# Patient Record
(demographics unavailable — no encounter records)

---

## 2024-10-09 NOTE — PHYSICAL EXAM
[Restricted in physically strenuous activity but ambulatory and able to carry out work of a light or sedentary nature] : Status 1- Restricted in physically strenuous activity but ambulatory and able to carry out work of a light or sedentary nature, e.g., light house work, office work [Normal] : grossly intact [de-identified] : Elevated BMI [de-identified] : dermatitis related to therapy

## 2024-10-09 NOTE — HISTORY OF PRESENT ILLNESS
[Disease:__________________________] : Disease: [unfilled] [de-identified] : Initial Presentation: 83 Divehi-speaking F with MedHx of pancreatic insufficiency, HTN  who initially presented to [facility] on 5/30/24 with complaints of 1 month of cough, SOB, fevers, body-aches, bruising and sore throat. She was found to have neutropenic fevers with ANC 0.00 in the setting of a WBC count of 24k and 85% blasts. 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 Her diagnosis was made on peripheral blood and she did not undergo BM biopsy on admission.  She was started on ATRA 5/31/24 and PHILIPPE on 6/1/24. Due to hyperleukocytosis as high as 28k, she was given GO x 1 on 6/1/24 in addition to a short course of hydroxyurea (5/31/24 - 6/1/24). 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 Regarding her body aches, she had some shoulder pain where upon X-ray showed the presence of acupuncture needle tips in her muscle and possibly areas of her bones in the shoulder. She underwent BM biopsy on 7/2/24 (Day 32 of combined PHILIPPE and ATRA), which showed a morphologic remission with residual PML-TJ on FISH at 1.5%. She was discharged to home   ==================================================================== Pathology:  Flow Cytometry on Peripheral blood: Consistent with Acute Promyelocytic Leukemia (APL), microgranular variant (74.5% of total) Myeloblasts (74.5% of total) with increased SS/FSC, positive for CD2 (subset), CD13, CD33, CD34 (subset positive, 17.6 % of total), CD38 (dim to negative), CD45 (dim), CD64 (small subset, dim),  (dim), MPO. NEGATIVE for sCD3, cyCD3, CD5, CD7, CD8, CD10, CD11b, CD14, CD15, CD16, CD19, CD20, CD56, cCD79a, ,  FISH studies show PML::TJ fusion (95%)  FLT3-ITD mutation: POSITIVE FLT3-TKD mutation: POSITIVE  Abnormal karyotype: 46,XX,t(15;17)(q24;q21)     {15           }/46,XX     {5           }  ==================================================================== Contacts:  Son in law Garth: [phone]  ==================================================================== Care Providers:  PMD: Dr Michelle Morin (Tel: [phone]; Fax: [phone]) Cardiology: Dr Roberth Herrera (Tel: [phone])   ==================================================================== [de-identified] : See above [de-identified] : 7/11/24: Initial outpatient visit. [place]  Kandice [id number]. She was discharged from [facility] leukemia unit on 7/9/24 and resumed PHILIPPE therapy the following day on 7/10/24 at [facility].   7/24/24: Follow-up. [place]  ID [id number]. She is currently wrapping up her 2 weeks off of therapy after induction. Her post induction peripheral blood PML-TJ testing was negative. She complains today of itching on her abdomen, legs and joint pains with continued swelling of her hands and feet. She forgot to stop Tretinoin temporarily. She was advised to hold it today until 7/29/24 (as this can cause skin irritation and itching). She continues to use furosemide prescribed by her PMD (Dr Morin) and we explained all other medications outside of Tretinoin and Arsenic trioxide will be managed by her other doctors.  8/20/24: Follow-up. [employer] Sep[employer]h [id number] for Taj[place]. Patient continues to have skin issues. We discussed the effect of arsenic and that creams can continue to be used. She will soon have a drug holiday for 4 weeks (PHILIPPE). She also complains of chronic MSK issues including low back pain, leg muscle pain. No bleeding issues, no leg edema.  9/16/24: Follow-up. Today is cycle 1 day 49. She forgot to take ATRA days 29-42 and instead started on day 47 (Sept 13). She continues to have right sided MSK complaints, unrelated to therapy / APL. We discussed follow-up with her PMD is warranted. She continues to have skin rash and itchiness, albeit somewhat improved, however she continues to use hydrocortisone cream as needed.  10/9/24: Follow-up. Today is Cycle 2 day 17. Family used for interpretation. She reports continued itching. She also has chills at night. No other constitutional issues. No headaches. She denies recent infections, fevers, GI or  issues.   A comprehensive review of systems was performed including constitutional, eyes, ENT, cardiovascular, respiratory, gastrointestinal, genitourinary, musculoskeletal, integumentary, neurological, psychiatric and hematologic / lymphatic. All pertinent positives are included in the H&P under interval history above and the remaining review of systems listed are negative.   ================================================================== Treatment Hx:  Induction: - Cycle 1 Day 1 ATRA 5/31/24 and PHILIPPE on 6/1/24. (hyperleukocytosis rec'd GO x 1 on 6/1/24 in addition to a short course of hydroxyurea (5/31/24 - 6/1/24)). She achieved MRD negativity on peripheral blood of PML-TJ (Day 44).  Consolidation with ATRA (2 weeks on and 2 weeks off x 8) and Arsenic Trioxide (4 weeks on and 4 weeks off x 4 cycles): - Cycle 1 Day 1 on 7/29/24 - Cycle 2 Day 1 on 9/23/24  ================================================================== [FreeTextEntry1] : [facility] APL 0406 Tretinoin + Arsenic + GO induction followed by Tretinoin and Arsenic consolidation

## 2024-10-09 NOTE — ASSESSMENT
[Curative] : Goals of care discussed with patient: Curative [FreeTextEntry1] : 82yo F (Turkish-speaking) on front-line induction with ATRA/PHILIPPE for High-Risk APL (Dx 6/30/2024). Bone marrow biopsy deferred at diagnosis. BM biopsy on day 32 of induction showed a morphologic response without myeloid immaturity, however there were 1.5% PML-TJ containing cells (likely differentiated) on aspirate by FISH. She was MRD negative via peripheral blood testing of PML-TJ on 7/13/24.  Consolidation with ATRA/PHILIPPE was started on 7/29/24. Today is Cycle 2 day 17 of Consolidation.  Heme:  - Continue 4 weeks on and 4 weeks off PHILIPPE at 0.15mg/kg/day M-F, HOLD for QTc > 465 ms, next cycle PHILIPPE on 9/23/24 - Continue ATRA 45 mg/m2 BID 2 weeks on and 2 weeks off, restarted 2 weeks on on 9/13/24, arsenic and PHILIPPE are off cycle - Arsenic/ATRA dermatitis: Itching of skin, hands and arms: Hydrocortisone cream as needed for symptom control - Cards: Monitor weekly QTc on EKGs with arsenic trioxide and maintain K > 4, Mg > 2. HTN: Not presently on BP meds. - HCM: HTN: Olmesartan per PMD (Dr Morin). Pancreatic insufficiency: Continue meds guided per PMD. Intermittent Volume overload (unlikely to be related to treated at this point, LVEF 57%, diastolic function determination was indeterminate on 6/4/24 TTE. Sees cardiologist Dr Herrera. - Follow-up every week on PHILIPPE therapy and every 2 weeks during her off weeks  ___ I personally have spent a total of 30 minutes of time on the date of this encounter reviewing test results, documenting findings, providing education, coordinating care and directly consulting with the patient and/or designated family member.

## 2024-11-14 NOTE — HISTORY OF PRESENT ILLNESS
[Disease:__________________________] : Disease: [unfilled] [ECOG Performance Status: 1 - Restricted in physically strenuous activity but ambulatory and able to carry out work of a light or sedentary nature] : Performance Status: 1 - Restricted in physically strenuous activity but ambulatory and able to carry out work of a light or sedentary nature, e.g., light house work, office work [de-identified] : Initial Presentation: 83 Nepali-speaking F with MedHx of pancreatic insufficiency, HTN  who initially presented to [facility] on 5/30/24 with complaints of 1 month of cough, SOB, fevers, body-aches, bruising and sore throat. She was found to have neutropenic fevers with ANC 0.00 in the setting of a WBC count of 24k and 85% blasts. 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 Her diagnosis was made on peripheral blood and she did not undergo BM biopsy on admission.  She was started on ATRA 5/31/24 and PHILIPPE on 6/1/24. Due to hyperleukocytosis as high as 28k, she was given GO x 1 on 6/1/24 in addition to a short course of hydroxyurea (5/31/24 - 6/1/24). 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 Regarding her body aches, she had some shoulder pain where upon X-ray showed the presence of acupuncture needle tips in her muscle and possibly areas of her bones in the shoulder. She underwent BM biopsy on 7/2/24 (Day 32 of combined PHILIPPE and ATRA), which showed a morphologic remission with residual PML-TJ on FISH at 1.5%. She was discharged to home   ==================================================================== Pathology:  Flow Cytometry on Peripheral blood: Consistent with Acute Promyelocytic Leukemia (APL), microgranular variant (74.5% of total) Myeloblasts (74.5% of total) with increased SS/FSC, positive for CD2 (subset), CD13, CD33, CD34 (subset positive, 17.6 % of total), CD38 (dim to negative), CD45 (dim), CD64 (small subset, dim),  (dim), MPO. NEGATIVE for sCD3, cyCD3, CD5, CD7, CD8, CD10, CD11b, CD14, CD15, CD16, CD19, CD20, CD56, cCD79a, ,  FISH studies show PML::TJ fusion (95%)  FLT3-ITD mutation: POSITIVE FLT3-TKD mutation: POSITIVE  Abnormal karyotype: 46,XX,t(15;17)(q24;q21) {15   }/46,XX  {5   }  [facility]: DNMT3A, p.Kkg745Czm, I, 2, VAF 47% c.2207G>A 19 FLT3, p.Kib169Imj, I, 13, VAF 24% c.2504A>T 20 FLT3, p.Bpf472Isa, I, 13, VAF 3% c.2503G>T 20 TET2, p.Wob6912Ynh, I, 4, VAF 47% c.3686T>C 6 FLT3, p.Vap149_Bhj202dek, I, 13, VAF <1% c.1789_1809 dup 14 *Alt: CCCATTTGAGATCATATTCATA  FLT3, p.?, I, 13, VAF <1% c.1837+4_18 37+5ins81 14 *Alt: CTTACTAAACTCTAAATTTTCTCTTGGAAACTCCCATTTGAGATCATATTCATATTCTCTGAAATCAACGTAGAGGTACTCA FLT3, p.[id number], I, 13, VAF 14% c.1824_1825 ins45 14 *Alt: TTTCTCTTGGAAACTCCCATTTGAGATCATATTCATATGGGGGCTC  ==================================================================== Contacts:  Son in law Garth: [phone]  ==================================================================== Care Providers:  PMD: Dr Michelle Morin (Tel: [phone]; Fax: [phone]) Cardiology: Dr Roberth Herrera (Tel: [phone])   ==================================================================== [de-identified] : See above [de-identified] : 7/11/24: Initial outpatient visit. Bengali  Kandice 200711. She was discharged from [facility] leukemia unit on 7/9/24 and resumed PHILIPPE therapy the following day on 7/10/24 at [facility].   7/24/24: Follow-up. Beng[place]  ID [id number]. She is currently wrapping up her 2 weeks off of therapy after induction. Her post induction peripheral blood PML-TJ testing was negative. She complains today of itching on her abdomen, legs and joint pains with continued swelling of her hands and feet. She forgot to stop Tretinoin temporarily. She was advised to hold it today until 7/29/24 (as this can cause skin irritation and itching). She continues to use furosemide prescribed by her PMD (Dr Morin) and we explained all other medications outside of Tretinoin and Arsenic trioxide will be managed by her other doctors.  8/20/24: Follow-up. [employer] Sep[employer]h [id number] for Bengali. Patient continues to have skin issues. We discussed the effect of arsenic and that creams can continue to be used. She will soon have a drug holiday for 4 weeks (PHILIPPE). She also complains of chronic MSK issues including low back pain, leg muscle pain. No bleeding issues, no leg edema.  9/16/24: Follow-up. Today is cycle 1 day 49. She forgot to take ATRA days 29-42 and instead started on day 47 (Sept 13). She continues to have right sided MSK complaints, unrelated to therapy / APL. We discussed follow-up with her PMD is warranted. She continues to have skin rash and itchiness, albeit somewhat improved, however she continues to use hydrocortisone cream as needed.  10/9/24: Follow-up. Today is Cycle 2 day 17. Family used for interpretation. She reports continued itching. She also has chills at night. No other constitutional issues. No headaches. She denies recent infections, fevers, GI or  issues.   11/13/24: Follow-up. Presents today with her son who translates for her. She is on C2 D52 of tretinoin. She will begin C3 with PHILIPPE on 11/18. Blood counts reviewed, proceed with planned treatment. Enquires about using protein and ginseng supplements. No constitutional symptoms, no bleeding concerns, no concerns for infection. No interval hospitalizations or urgent care visits.   A comprehensive review of systems was performed including constitutional, eyes, ENT, cardiovascular, respiratory, gastrointestinal, genitourinary, musculoskeletal, integumentary, neurological, psychiatric and hematologic / lymphatic. All pertinent positives are included in the H&P under interval history above and the remaining review of systems listed are negative.   ================================================================== Treatment Hx:  Induction: - Cycle 1 Day 1 ATRA 5/31/24 and PHILIPPE on 6/1/24. (hyperleukocytosis rec'd GO x 1 on 6/1/24 in addition to a short course of hydroxyurea (5/31/24 - 6/1/24)). She achieved MRD negativity on peripheral blood of PML-TJ (Day 44).  Consolidation with ATRA (2 weeks on and 2 weeks off x 8) and Arsenic Trioxide (4 weeks on and 4 weeks off x 4 cycles): - Cycle 1 Day 1 on 7/29/24 - Cycle 2 Day 1 on 9/23/24  ================================================================== [FreeTextEntry1] : [facility] APL 0406 Tretinoin + Arsenic + GO induction followed by Tretinoin and Arsenic consolidation

## 2024-11-14 NOTE — ASSESSMENT
[Curative] : Goals of care discussed with patient: Curative [FreeTextEntry1] : 84yo F (Hungarian-speaking) on front-line induction with ATRA/PHILIPPE for High-Risk APL (Dx 6/30/2024) associated with multiple Tier I mutations including FLT3-ITD, FLT3-TKD, DNMT3A, TET2. Bone marrow biopsy deferred at diagnosis. BM biopsy on day 32 of induction showed a morphologic response without myeloid immaturity, however there were 1.5% PML-TJ containing cells (likely differentiated) on aspirate by FISH. She was MRD negative via peripheral blood testing of PML-TJ on 7/13/24.  Consolidation with ATRA/PHILIPPE was started on 7/29/24. Today is Cycle 2 day 52 of Consolidation.  Heme:  - Continue 4 weeks on and 4 weeks off PHILIPPE at 0.15mg/kg/day M-F, HOLD for QTc > 465 ms, next cycle PHILIPPE on 11/18/24 - Continue ATRA 45 mg/m2 BID 2 weeks on and 2 weeks off, arsenic and PHILIPPE are off cycle due to days missed. See calendar. - Cards: Monitor weekly QTc on EKGs with arsenic trioxide and maintain K > 4, Mg > 2. HTN: Not presently on BP meds. - HCM: HTN: Olmesartan per PMD (Dr Morin). Pancreatic insufficiency: Continue meds guided per PMD. Intermittent Volume overload (unlikely to be related to treated at this point, LVEF 57%, diastolic function determination was indeterminate on 6/4/24 TTE. Sees cardiologist Dr Herrera. - Follow-up every week on PHILIPPE therapy and every 2 weeks during her off weeks  ___ I personally have spent a total of 30 minutes of time on the date of this encounter reviewing test results, documenting findings, providing education, coordinating care and directly consulting with the patient and/or designated family member.

## 2024-11-14 NOTE — PHYSICAL EXAM
[Restricted in physically strenuous activity but ambulatory and able to carry out work of a light or sedentary nature] : Status 1- Restricted in physically strenuous activity but ambulatory and able to carry out work of a light or sedentary nature, e.g., light house work, office work [Normal] : affect appropriate [de-identified] : dermatitis, treatment-related  [de-identified] : Elevated BMI

## 2024-11-14 NOTE — PHYSICAL EXAM
[Restricted in physically strenuous activity but ambulatory and able to carry out work of a light or sedentary nature] : Status 1- Restricted in physically strenuous activity but ambulatory and able to carry out work of a light or sedentary nature, e.g., light house work, office work [Normal] : affect appropriate [de-identified] : dermatitis, treatment-related  [de-identified] : Elevated BMI

## 2024-11-14 NOTE — HISTORY OF PRESENT ILLNESS
[Disease:__________________________] : Disease: [unfilled] [ECOG Performance Status: 1 - Restricted in physically strenuous activity but ambulatory and able to carry out work of a light or sedentary nature] : Performance Status: 1 - Restricted in physically strenuous activity but ambulatory and able to carry out work of a light or sedentary nature, e.g., light house work, office work [de-identified] : Initial Presentation: 83 Ukrainian-speaking F with MedHx of pancreatic insufficiency, HTN  who initially presented to [facility] on 5/30/24 with complaints of 1 month of cough, SOB, fevers, body-aches, bruising and sore throat. She was found to have neutropenic fevers with ANC 0.00 in the setting of a WBC count of 24k and 85% blasts. 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 Her diagnosis was made on peripheral blood and she did not undergo BM biopsy on admission.  She was started on ATRA 5/31/24 and PHILIPPE on 6/1/24. Due to hyperleukocytosis as high as 28k, she was given GO x 1 on 6/1/24 in addition to a short course of hydroxyurea (5/31/24 - 6/1/24). 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 Regarding her body aches, she had some shoulder pain where upon X-ray showed the presence of acupuncture needle tips in her muscle and possibly areas of her bones in the shoulder. She underwent BM biopsy on 7/2/24 (Day 32 of combined PHILIPPE and ATRA), which showed a morphologic remission with residual PML-TJ on FISH at 1.5%. She was discharged to home   ==================================================================== Pathology:  Flow Cytometry on Peripheral blood: Consistent with Acute Promyelocytic Leukemia (APL), microgranular variant (74.5% of total) Myeloblasts (74.5% of total) with increased SS/FSC, positive for CD2 (subset), CD13, CD33, CD34 (subset positive, 17.6 % of total), CD38 (dim to negative), CD45 (dim), CD64 (small subset, dim),  (dim), MPO. NEGATIVE for sCD3, cyCD3, CD5, CD7, CD8, CD10, CD11b, CD14, CD15, CD16, CD19, CD20, CD56, cCD79a, ,  FISH studies show PML::TJ fusion (95%)  FLT3-ITD mutation: POSITIVE FLT3-TKD mutation: POSITIVE  Abnormal karyotype: 46,XX,t(15;17)(q24;q21) {15   }/46,XX  {5   }  [facility]: DNMT3A, p.Zhj019Mfs, I, 2, VAF 47% c.2207G>A 19 FLT3, p.Qxq950Sds, I, 13, VAF 24% c.2504A>T 20 FLT3, p.Vts383Wjy, I, 13, VAF 3% c.2503G>T 20 TET2, p.Srb7715Kyw, I, 4, VAF 47% c.3686T>C 6 FLT3, p.Qut149_Qnb378vef, I, 13, VAF <1% c.1789_1809 dup 14 *Alt: CCCATTTGAGATCATATTCATA  FLT3, p.?, I, 13, VAF <1% c.1837+4_18 37+5ins81 14 *Alt: CTTACTAAACTCTAAATTTTCTCTTGGAAACTCCCATTTGAGATCATATTCATATTCTCTGAAATCAACGTAGAGGTACTCA FLT3, p.Def069_Mxl688noj28, I, 13, VAF 14% c.1824_1825 ins45 14 *Alt: TTTCTCTTGGAAACTCCCATTTGAGATCATATTCATATGGGGGCTC  ==================================================================== Contacts:  Son in law Garth: [phone]  ==================================================================== Care Providers:  PMD: Dr Michelle Morin (Tel: [phone]; Fax: [phone]) Cardiology: Dr Roberth Herrera (Tel: [phone])   ==================================================================== [de-identified] : See above [de-identified] : 7/11/24: Initial outpatient visit. Romanian  Kandice 200711. She was discharged from [facility] leukemia unit on 7/9/24 and resumed PHILIPPE therapy the following day on 7/10/24 at [facility].   7/24/24: Follow-up. Romanian  ID [id number]. She is currently wrapping up her 2 weeks off of therapy after induction. Her post induction peripheral blood PML-TJ testing was negative. She complains today of itching on her abdomen, legs and joint pains with continued swelling of her hands and feet. She forgot to stop Tretinoin temporarily. She was advised to hold it today until 7/29/24 (as this can cause skin irritation and itching). She continues to use furosemide prescribed by her PMD (Dr Morin) and we explained all other medications outside of Tretinoin and Arsenic trioxide will be managed by her other doctors.  8/20/24: Follow-up. [employer] Sep[employer]h [id number] for [place]. Patient continues to have skin issues. We discussed the effect of arsenic and that creams can continue to be used. She will soon have a drug holiday for 4 weeks (PHILIPPE). She also complains of chronic MSK issues including low back pain, leg muscle pain. No bleeding issues, no leg edema.  9/16/24: Follow-up. Today is cycle 1 day 49. She forgot to take ATRA days 29-42 and instead started on day 47 (Sept 13). She continues to have right sided MSK complaints, unrelated to therapy / APL. We discussed follow-up with her PMD is warranted. She continues to have skin rash and itchiness, albeit somewhat improved, however she continues to use hydrocortisone cream as needed.  10/9/24: Follow-up. Today is Cycle 2 day 17. Family used for interpretation. She reports continued itching. She also has chills at night. No other constitutional issues. No headaches. She denies recent infections, fevers, GI or  issues.   11/13/24: Follow-up. Presents today with her son who translates for her. She is on C2 D52 of tretinoin. She will begin C3 with PHILIPPE on 11/18. Blood counts reviewed, proceed with planned treatment. Enquires about using protein and ginseng supplements. No constitutional symptoms, no bleeding concerns, no concerns for infection. No interval hospitalizations or urgent care visits.   A comprehensive review of systems was performed including constitutional, eyes, ENT, cardiovascular, respiratory, gastrointestinal, genitourinary, musculoskeletal, integumentary, neurological, psychiatric and hematologic / lymphatic. All pertinent positives are included in the H&P under interval history above and the remaining review of systems listed are negative.   ================================================================== Treatment Hx:  Induction: - Cycle 1 Day 1 ATRA 5/31/24 and PHILIPPE on 6/1/24. (hyperleukocytosis rec'd GO x 1 on 6/1/24 in addition to a short course of hydroxyurea (5/31/24 - 6/1/24)). She achieved MRD negativity on peripheral blood of PML-TJ (Day 44).  Consolidation with ATRA (2 weeks on and 2 weeks off x 8) and Arsenic Trioxide (4 weeks on and 4 weeks off x 4 cycles): - Cycle 1 Day 1 on 7/29/24 - Cycle 2 Day 1 on 9/23/24  ================================================================== [FreeTextEntry1] : Meagan [facility] APL 0406 Tretinoin + Arsenic + GO induction followed by Tretinoin and Arsenic consolidation

## 2024-11-14 NOTE — ASSESSMENT
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 Bone marrow biopsy deferred at diagnosis. BM biopsy on day 32 of induction showed a morphologic response without myeloid immaturity, however there were 1.5% PML-TJ containing cells (likely differentiated) on aspirate by FISH. She was MRD negative via peripheral blood testing of PML-TJ on 7/13/24.  Consolidation with ATRA/PHILIPPE was started on 7/29/24. Today is Cycle 2 day 52 of Consolidation.  Heme:  - Continue 4 weeks on and 4 weeks off PHILIPPE at 0.15mg/kg/day M-F, HOLD for QTc > 465 ms, next cycle PHILIPPE on 11/18/24 - Continue ATRA 45 mg/m2 BID 2 weeks on and 2 weeks off, arsenic and PHILIPPE are off cycle due to days missed. See calendar. - Cards: Monitor weekly QTc on EKGs with arsenic trioxide and maintain K > 4, Mg > 2. HTN: Not presently on BP meds. - HCM: HTN: Olmesartan per PMD (Dr Morin). Pancreatic insufficiency: Continue meds guided per PMD. Intermittent Volume overload (unlikely to be related to treated at this point, LVEF 57%, diastolic function determination was indeterminate on 6/4/24 TTE. Sees cardiologist Dr Herrera. - Follow-up every week on PHILIPPE therapy and every 2 weeks during her off weeks  ___ I personally have spent a total of 30 minutes of time on the date of this encounter reviewing test results, documenting findings, providing education, coordinating care and directly consulting with the patient and/or designated family member.

## 2024-12-03 NOTE — ASSESSMENT
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 Bone marrow biopsy deferred at diagnosis. BM biopsy on day 32 of induction showed a morphologic response without myeloid immaturity, however there were 1.5% PML-TJ containing cells (likely differentiated) on aspirate by FISH. She was MRD negative via peripheral blood testing of PML-TJ on 7/13/24.  Consolidation with ATRA/PHILIPPE was started on 7/29/24. Today is Cycle 3 day 16 of Consolidation.  Heme:  - Continue 4 weeks on and 4 weeks off PHILIPPE at 0.15mg/kg/day M-F, HOLD for QTc > 465 ms. - Tretinoin 2 weeks on and 2 weeks of per schedule - Cards: Monitor weekly QTc on EKGs with arsenic trioxide and maintain K > 4, Mg > 2. HTN: Not presently on BP meds. - HCM: HTN: Olmesartan per PMD (Dr Morin). Pancreatic insufficiency: Continue meds guided per PMD. Intermittent Volume overload (unlikely to be related to treated at this point, LVEF 57%, diastolic function determination was indeterminate on 6/4/24 TTE. Sees cardiologist Dr Herrera. - Follow-up every week on PHILIPPE therapy and every 2 weeks during her off weeks  ___ I personally have spent a total of 30 minutes of time on the date of this encounter reviewing test results, documenting findings, providing education, coordinating care and directly consulting with the patient and/or designated family member.

## 2024-12-03 NOTE — PHYSICAL EXAM
[Restricted in physically strenuous activity but ambulatory and able to carry out work of a light or sedentary nature] : Status 1- Restricted in physically strenuous activity but ambulatory and able to carry out work of a light or sedentary nature, e.g., light house work, office work [Normal] : affect appropriate [de-identified] : Elevated BMI [de-identified] : right anterior foot edema, mild

## 2024-12-12 NOTE — HISTORY OF PRESENT ILLNESS
[de-identified] : Pt in Treatment Room today for D4 arsenic, she initially reported headache, dizziness to nurse. She also noted yesterday that she had bpdy aches and chills, she denied fever. Her VS on presentation were BP 84/53, HR 93, T 97.9F, spO2 96% RA. Heart RRR, Lungs CTA. Pt evaluated at chair side with Practice RN, Jose, who discussed pt with Dr. Fernández via MS teams, recommended total 500cc IVF and to recheck BP at that time if stable or improving would proceed with arsenic, also wanted to do fever work up. Blood cultures x 3, urine culture and respiratory pathogen PCR were ordered. While cultures were being collected purple lumen of PICC line "clogged" unable to flush. Pt completed 500cc IVF, however then started rigoring. VS at the time /76, T97.6F, spO2 96% RA, . Given rigors, clotted PICC and hypotension c/f bacteremia/sepsis. EMS was called and pt was [facility] ED.

## 2024-12-31 NOTE — PHYSICAL EXAM
[Restricted in physically strenuous activity but ambulatory and able to carry out work of a light or sedentary nature] : Status 1- Restricted in physically strenuous activity but ambulatory and able to carry out work of a light or sedentary nature, e.g., light house work, office work [Normal] : affect appropriate [de-identified] : Elevated BMI

## 2024-12-31 NOTE — HISTORY OF PRESENT ILLNESS
[Disease:__________________________] : Disease: [unfilled] [ECOG Performance Status: 1 - Restricted in physically strenuous activity but ambulatory and able to carry out work of a light or sedentary nature] : Performance Status: 1 - Restricted in physically strenuous activity but ambulatory and able to carry out work of a light or sedentary nature, e.g., light house work, office work [de-identified] : Initial Presentation: 83 Georgian-speaking F with MedHx of pancreatic insufficiency, HTN  who initially presented to [facility] on 5/30/24 with complaints of 1 month of cough, SOB, fevers, body-aches, bruising and sore throat. She was found to have neutropenic fevers with ANC 0.00 in the setting of a WBC count of 24k and 85% blasts. 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 Her diagnosis was made on peripheral blood and she did not undergo BM biopsy on admission.  She was started on ATRA 5/31/24 and PHILIPPE on 6/1/24. Due to hyperleukocytosis as high as 28k, she was given GO x 1 on 6/1/24 in addition to a short course of hydroxyurea (5/31/24 - 6/1/24). 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 Regarding her body aches, she had some shoulder pain where upon X-ray showed the presence of acupuncture needle tips in her muscle and possibly areas of her bones in the shoulder. She underwent BM biopsy on 7/2/24 (Day 32 of combined PHILIPPE and ATRA), which showed a morphologic remission with residual PML-TJ on FISH at 1.5%. She was discharged to home   ==================================================================== Pathology:  Flow Cytometry on Peripheral blood: Consistent with Acute Promyelocytic Leukemia (APL), microgranular variant (74.5% of total) Myeloblasts (74.5% of total) with increased SS/FSC, positive for CD2 (subset), CD13, CD33, CD34 (subset positive, 17.6 % of total), CD38 (dim to negative), CD45 (dim), CD64 (small subset, dim),  (dim), MPO. NEGATIVE for sCD3, cyCD3, CD5, CD7, CD8, CD10, CD11b, CD14, CD15, CD16, CD19, CD20, CD56, cCD79a, ,  FISH studies show PML::TJ fusion (95%)  FLT3-ITD mutation: POSITIVE FLT3-TKD mutation: POSITIVE  Abnormal karyotype: 46,XX,t(15;17)(q24;q21)   {15       }/46,XX    {5       }  [facility] 6/11/[facility]: DNMT3A, p.Rcg786Bff, I, 2, VAF 47% c.2207G>A 19 FLT3, p.Tvl199Ujn, I, 13, VAF 24% c.2504A>T 20 FLT3, p.Tbc057Ixp, I, 13, VAF 3% c.2503G>T 20 TET2, p.Eni4773Yuq, I, 4, VAF 47% c.3686T>C 6 FLT3, p.Hii404_Iuh120hfh, I, 13, VAF <1% c.1789_1809 dup 14 *Alt: CCCATTTGAGATCATATTCATA  FLT3, p.?, I, 13, VAF <1% c.1837+4_18 37+5ins81 14 *Alt: CTTACTAAACTCTAAATTTTCTCTTGGAAACTCCCATTTGAGATCATATTCATATTCTCTGAAATCAACGTAGAGGTACTCA FLT3, p.Hgf358_Apb973qfh90, I, 13, VAF 14% c.1824_1825 ins45 14 *Alt: TTTCTCTTGGAAACTCCCATTTGAGATCATATTCATATGGGGGCTC  ==================================================================== Contacts:  Son in law Garth: [phone]  ==================================================================== Care Providers:  PMD: Dr Michelle Morin (Tel: [phone]; Fax: [phone]) Cardiology: Dr Roberth Herrera (Tel: [phone])   ==================================================================== [de-identified] : See above [FreeTextEntry1] : Meagan fountain APL 0406 Tretinoin + Arsenic + GO induction followed by Tretinoin and Arsenic consolidation [de-identified] : 7/11/24: Initial outpatient visit. Arabic  Kandice [id number]. She was discharged from [facility] leukemia unit on 7/9/24 and resumed PHILIPPE therapy the following day on 7/10/24 at [facility].   7/24/24: Follow-up. Arabic  ID [id number]. She is currently wrapping up her 2 weeks off of therapy after induction. Her post induction peripheral blood PML-TJ testing was negative. She complains today of itching on her abdomen, legs and joint pains with continued swelling of her hands and feet. She forgot to stop Tretinoin temporarily. She was advised to hold it today until 7/29/24 (as this can cause skin irritation and itching). She continues to use furosemide prescribed by her PMD (Dr Morin) and we explained all other medications outside of Tretinoin and Arsenic trioxide will be managed by her other doctors.  8/20/24: Follow-up. [employer] Sepideh [id number] for Arabic. Patient continues to have skin issues. We discussed the effect of arsenic and that creams can continue to be used. She will soon have a drug holiday for 4 weeks (PHILIPPE). She also complains of chronic MSK issues including low back pain, leg muscle pain. No bleeding issues, no leg edema.  9/16/24: Follow-up. Today is cycle 1 day 49. She forgot to take ATRA days 29-42 and instead started on day 47 (Sept 13). She continues to have right sided MSK complaints, unrelated to therapy / APL. We discussed follow-up with her PMD is warranted. She continues to have skin rash and itchiness, albeit somewhat improved, however she continues to use hydrocortisone cream as needed.  10/9/24: Follow-up. Today is Cycle 2 day 17. Family used for interpretation. She reports continued itching. She also has chills at night. No other constitutional issues. No headaches. She denies recent infections, fevers, GI or  issues.   11/13/24: Follow-up. Presents today with her son who translates for her. She is on C2 D52 of tretinoin. She will begin C3 with PHILIPPE on 11/18. Blood counts reviewed, proceed with planned treatment. Enquires about using protein and ginseng supplements. No constitutional symptoms, no bleeding concerns, no concerns for infection. No interval hospitalizations or urgent care visits.   12/3/24: Follow-up. Arabic  Piyush ([id number]). Today is cycle 3 Day 9. She complains of headache, Tylenol helps, she also complains of facial swelling, dry cough and clear rhinorrhea for about 1 week. No known sick contacts at home. She also noted right toe / anterior foot swelling that is chronic. No pain. She is tolerating the PHILIPPE well. On schedule with tretinoin. No constitutional issues.  12/30/24: Patient presents for follow up today. She is cycle 3 day 43 today. Patient denies any fevers or chills. Has some tremors of the hands, fingers, and feet has been chronic, not getting worse. No chest pain or shortness of breath. Sometimes has a weird feeling in the left side unable to really localize states that it is like a "prickly feeling". Feeling like she is getting a lot of phlegm as well, denies any throat pain or soreness. Whenever she coughts she gets whilte phlegm. Recently admitted to [facility] on 12/13 for Staph Epi bacteremia, had PICC line replaced from [facility] to [facility]. Completed her ATRA on 12/15/24. Was also in the ED on 12/24 for hyperkalemia, w/u was negative, her K returned back to normal and EKG with QTc of 454.   A comprehensive review of systems was performed including constitutional, eyes, ENT, cardiovascular, respiratory, gastrointestinal, genitourinary, musculoskeletal, integumentary, neurological, psychiatric and hematologic / lymphatic. All pertinent positives are included in the H&P under interval history above and the remaining review of systems listed are negative.   ================================================================== Treatment Hx:  Induction: - Cycle 1 Day 1 ATRA 5/31/24 and PHILIPPE on 6/1/24. (hyperleukocytosis rec'd GO x 1 on 6/1/24 in addition to a short course of hydroxyurea (5/31/24 - 6/1/24)). She achieved MRD negativity on peripheral blood of PML-TJ (Day 44).  Consolidation with ATRA (2 weeks on and 2 weeks off x 8) and Arsenic Trioxide (4 weeks on and 4 weeks off x 4 cycles): - Cycle 1 Day 1 on 7/29/24 - Cycle 2 Day 1 on 9/23/24 - Cycle 3 Day 1 on 11/18/24 (last two weeks of ATRA were from 12/30/24-1/12/25) - Cycle 4 Day 1 on 1/13/25    ==================================================================

## 2024-12-31 NOTE — HISTORY OF PRESENT ILLNESS
[Disease:__________________________] : Disease: [unfilled] [ECOG Performance Status: 1 - Restricted in physically strenuous activity but ambulatory and able to carry out work of a light or sedentary nature] : Performance Status: 1 - Restricted in physically strenuous activity but ambulatory and able to carry out work of a light or sedentary nature, e.g., light house work, office work [de-identified] : Initial Presentation: 83 Setswana-speaking F with MedHx of pancreatic insufficiency, HTN  who initially presented to [facility] on 5/30/24 with complaints of 1 month of cough, SOB, fevers, body-aches, bruising and sore throat. She was found to have neutropenic fevers with ANC 0.00 in the setting of a WBC count of 24k and 85% blasts. 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 Her diagnosis was made on peripheral blood and she did not undergo BM biopsy on admission.  She was started on ATRA 5/31/24 and PHILIPPE on 6/1/24. Due to hyperleukocytosis as high as 28k, she was given GO x 1 on 6/1/24 in addition to a short course of hydroxyurea (5/31/24 - 6/1/24). 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 Regarding her body aches, she had some shoulder pain where upon X-ray showed the presence of acupuncture needle tips in her muscle and possibly areas of her bones in the shoulder. She underwent BM biopsy on 7/2/24 (Day 32 of combined PHILIPPE and ATRA), which showed a morphologic remission with residual PML-TJ on FISH at 1.5%. She was discharged to home   ==================================================================== Pathology:  Flow Cytometry on Peripheral blood: Consistent with Acute Promyelocytic Leukemia (APL), microgranular variant (74.5% of total) Myeloblasts (74.5% of total) with increased SS/FSC, positive for CD2 (subset), CD13, CD33, CD34 (subset positive, 17.6 % of total), CD38 (dim to negative), CD45 (dim), CD64 (small subset, dim),  (dim), MPO. NEGATIVE for sCD3, cyCD3, CD5, CD7, CD8, CD10, CD11b, CD14, CD15, CD16, CD19, CD20, CD56, cCD79a, ,  FISH studies show PML::TJ fusion (95%)  FLT3-ITD mutation: POSITIVE FLT3-TKD mutation: POSITIVE  Abnormal karyotype: 46,XX,t(15;17)(q24;q21)   {15       }/46,XX    {5       }  [facility]: DNMT3A, p.Anr212Tmi, I, 2, VAF 47% c.2207G>A 19 FLT3, p.Krn212Rzy, I, 13, VAF 24% c.2504A>T 20 FLT3, p.Arf086Zrn, I, 13, VAF 3% c.2503G>T 20 TET2, p.Lus5055Snh, I, 4, VAF 47% c.3686T>C 6 FLT3, p.Hta962_Ttp083sio, I, 13, VAF <1% c.1789_1809 dup 14 *Alt: CCCATTTGAGATCATATTCATA  FLT3, p.?, I, 13, VAF <1% c.1837+4_18 37+5ins81 14 *Alt: CTTACTAAACTCTAAATTTTCTCTTGGAAACTCCCATTTGAGATCATATTCATATTCTCTGAAATCAACGTAGAGGTACTCA FLT3, p.Znh560_Bwa606ktl94, I, 13, VAF 14% c.1824_1825 ins45 14 *Alt: TTTCTCTTGGAAACTCCCATTTGAGATCATATTCATATGGGGGCTC  ==================================================================== Contacts:  Son in law Garth: [phone]  ==================================================================== Care Providers:  PMD: Dr Michelle Morin (Tel: [phone]; Fax: [phone]) Cardiology: Dr Roberth Herrera (Tel: [phone])   ==================================================================== [de-identified] : See above [FreeTextEntry1] : Meagan fountain APL 0406 Tretinoin + Arsenic + GO induction followed by Tretinoin and Arsenic consolidation [de-identified] : 7/11/24: Initial outpatient visit. Bulgarian  Kandice [id number]. She was discharged from [facility] leukemia unit on 7/9/24 and resumed PHILIPPE therapy the following day on 7/10/24 at [facility].   7/24/24: Follow-up. Bulgarian  ID [id number]. She is currently wrapping up her 2 weeks off of therapy after induction. Her post induction peripheral blood PML-TJ testing was negative. She complains today of itching on her abdomen, legs and joint pains with continued swelling of her hands and feet. She forgot to stop Tretinoin temporarily. She was advised to hold it today until 7/29/24 (as this can cause skin irritation and itching). She continues to use furosemide prescribed by her PMD (Dr Morin) and we explained all other medications outside of Tretinoin and Arsenic trioxide will be managed by her other doctors.  8/20/24: Follow-up. [employer] Sep[employer]h [id number] for Bulgarian. Patient continues to have skin issues. We discussed the effect of arsenic and that creams can continue to be used. She will soon have a drug holiday for 4 weeks (PHILIPPE). She also complains of chronic MSK issues including low back pain, leg muscle pain. No bleeding issues, no leg edema.  9/16/24: Follow-up. Today is cycle 1 day 49. She forgot to take ATRA days 29-42 and instead started on day 47 (Sept 13). She continues to have right sided MSK complaints, unrelated to therapy / APL. We discussed follow-up with her PMD is warranted. She continues to have skin rash and itchiness, albeit somewhat improved, however she continues to use hydrocortisone cream as needed.  10/9/24: Follow-up. Today is Cycle 2 day 17. Family used for interpretation. She reports continued itching. She also has chills at night. No other constitutional issues. No headaches. She denies recent infections, fevers, GI or  issues.   11/13/24: Follow-up. Presents today with her son who translates for her. She is on C2 D52 of tretinoin. She will begin C3 with PHILIPPE on 11/18. Blood counts reviewed, proceed with planned treatment. Enquires about using protein and ginseng supplements. No constitutional symptoms, no bleeding concerns, no concerns for infection. No interval hospitalizations or urgent care visits.   12/3/24: Follow-up. Bulgarian  Piyush ([id number]). Today is cycle 3 Day 9. She complains of headache, Tylenol helps, she also complains of facial swelling, dry cough and clear rhinorrhea for about 1 week. No known sick contacts at home. She also noted right toe / anterior foot swelling that is chronic. No pain. She is tolerating the PHILIPPE well. On schedule with tretinoin. No constitutional issues.  12/30/24: Patient presents for follow up today. She is cycle 3 day 43 today. Patient denies any fevers or chills. Has some tremors of the hands, fingers, and feet has been chronic, not getting worse. No chest pain or shortness of breath. Sometimes has a weird feeling in the left side unable to really localize states that it is like a "prickly feeling". Feeling like she is getting a lot of phlegm as well, denies any throat pain or soreness. Whenever she coughts she gets whilte phlegm. Recently admitted to [facility] on 12/13 for Staph Epi bacteremia, had PICC line replaced from [facility] to [facility]. Completed her ATRA on 12/15/24. Was also in the ED on 12/24 for hyperkalemia, w/u was negative, her K returned back to normal and EKG with QTc of 454.   A comprehensive review of systems was performed including constitutional, eyes, ENT, cardiovascular, respiratory, gastrointestinal, genitourinary, musculoskeletal, integumentary, neurological, psychiatric and hematologic / lymphatic. All pertinent positives are included in the H&P under interval history above and the remaining review of systems listed are negative.   ================================================================== Treatment Hx:  Induction: - Cycle 1 Day 1 ATRA 5/31/24 and PHILIPPE on 6/1/24. (hyperleukocytosis rec'd GO x 1 on 6/1/24 in addition to a short course of hydroxyurea (5/31/24 - 6/1/24)). She achieved MRD negativity on peripheral blood of PML-TJ (Day 44).  Consolidation with ATRA (2 weeks on and 2 weeks off x 8) and Arsenic Trioxide (4 weeks on and 4 weeks off x 4 cycles): - Cycle 1 Day 1 on 7/29/24 - Cycle 2 Day 1 on 9/23/24 - Cycle 3 Day 1 on 11/18/24 (last two weeks of ATRA were from 12/30/24-1/12/25) - Cycle 4 Day 1 on 1/13/25    ==================================================================

## 2024-12-31 NOTE — ASSESSMENT
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 Bone marrow biopsy deferred at diagnosis. BM biopsy on day 32 of induction showed a morphologic response without myeloid immaturity, however there were 1.5% PML-TJ containing cells (likely differentiated) on aspirate by FISH. She was MRD negative via peripheral blood testing of PML-TJ on 7/13/24.  Consolidation with ATRA/PHILIPPE was started on 7/29/24. Today is Cycle 3 day 43 of Consolidation.  Heme:  - Continue 4 weeks on and 4 weeks off PHILIPPE at 0.15mg/kg/day M-F, HOLD for QTc > 465 ms. - Hand tremors, possibly related to PHILIPPE and effects exacerbated in a low thiamine state, recommended B-complex vitamin in addition to monitoring Mg levels. - Tretinoin 2 weeks on and 2 weeks of per schedule (starting again 12/30/24) - Cards: Monitor weekly QTc on EKGs with arsenic trioxide and maintain K > 4, Mg > 2. HTN: Not presently on BP meds. - Will continue to monitor tremors in the hands and legs that worsen during PHILIPPE treatment and improve on weeks that she is off. Advised patient take B complex vitamins particularly thiamine for PPx against thiamine deficiency and possible Wernicke's.  - HCM: HTN: Olmesartan per PMD (Dr Morin). Pancreatic insufficiency: Continue meds guided per PMD.. - Follow-up every week on PHILIPPE therapy and every 2 weeks during her off weeks  Patient seen and d/w Dr. Fernández.  Wilson Mims MD. PGY-V Hematology Oncology Fellow   ___ I personally have spent a total of 30 minutes of time on the date of this encounter reviewing test results, documenting findings, providing education, coordinating care and directly consulting with the patient and/or designated family member.

## 2024-12-31 NOTE — ASSESSMENT
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 Bone marrow biopsy deferred at diagnosis. BM biopsy on day 32 of induction showed a morphologic response without myeloid immaturity, however there were 1.5% PML-TJ containing cells (likely differentiated) on aspirate by FISH. She was MRD negative via peripheral blood testing of PML-TJ on 7/13/24.  Consolidation with ATRA/PHILIPPE was started on 7/29/24. Today is Cycle 3 day 43 of Consolidation.  Heme:  - Continue 4 weeks on and 4 weeks off PHILIPPE at 0.15mg/kg/day M-F, HOLD for QTc > 465 ms. - Hand tremors, possibly related to PHILIPPE and effects exacerbated in a low thiamine state, recommended B-complex vitamin in addition to monitoring Mg levels. - Tretinoin 2 weeks on and 2 weeks of per schedule (starting again 12/30/24) - Cards: Monitor weekly QTc on EKGs with arsenic trioxide and maintain K > 4, Mg > 2. HTN: Not presently on BP meds. - Will continue to monitor tremors in the hands and legs that worsen during PHILIPPE treatment and improve on weeks that she is off. Advised patient take B complex vitamins particularly thiamine for PPx against thiamine deficiency and possible Wernicke's.  - HCM: HTN: Olmesartan per PMD (Dr Morin). Pancreatic insufficiency: Continue meds guided per PMD.. - Follow-up every week on PHILIPPE therapy and every 2 weeks during her off weeks  Patient seen and d/w Dr. Fernández.  Wilson Mims MD. PGY-V Hematology Oncology Fellow   ___ I personally have spent a total of 30 minutes of time on the date of this encounter reviewing test results, documenting findings, providing education, coordinating care and directly consulting with the patient and/or designated family member.

## 2024-12-31 NOTE — PHYSICAL EXAM
[Restricted in physically strenuous activity but ambulatory and able to carry out work of a light or sedentary nature] : Status 1- Restricted in physically strenuous activity but ambulatory and able to carry out work of a light or sedentary nature, e.g., light house work, office work [Normal] : affect appropriate [de-identified] : Elevated BMI

## 2025-01-13 NOTE — PHYSICAL EXAM
[Restricted in physically strenuous activity but ambulatory and able to carry out work of a light or sedentary nature] : Status 1- Restricted in physically strenuous activity but ambulatory and able to carry out work of a light or sedentary nature, e.g., light house work, office work [Normal] : affect appropriate [de-identified] : Elevated BMI

## 2025-01-13 NOTE — ASSESSMENT
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 Bone marrow biopsy deferred at diagnosis. BM biopsy on day 32 of induction showed a morphologic response without myeloid immaturity, however there were 1.5% PML-TJ containing cells (likely differentiated) on aspirate by FISH. She was MRD negative via peripheral blood testing of PML-TJ on 7/13/24.  Consolidation with ATRA/PHILIPPE was started on 7/29/24. Today is Cycle 4 day 1 of Consolidation.  Heme:  - Continue 4 weeks on and 4 weeks off PHILIPPE at 0.15mg/kg/day M-F, HOLD for QTc > 465 ms. - Hand tremors, possibly related to PHILIPPE and effects exacerbated in a low thiamine state, recommended B-complex vitamin in addition to monitoring Mg levels. - Tretinoin 2 weeks on and 2 weeks of per schedule (12/30/24-1/12/25), will start again 1/27/25. - Cards: Monitor weekly QTc on EKGs with arsenic trioxide and maintain K > 4, Mg > 2. HTN: Not presently on BP meds. EKG reviewed today, QTc < 430 ms - Will continue to monitor tremors in the hands and legs that worsen during PHILIPPE treatment and improve on weeks that she is off. Taking B-complex vitamins. Low suspicion that intermittent shaking / tremors is related to Wernicke's. - HCM: HTN: Olmesartan per PMD (Dr Morin). Pancreatic insufficiency: Continue meds guided per PMD.. - Follow-up every week on PHILIPPE therapy and every 2 weeks during her off weeks  ___ I personally have spent a total of 30 minutes of time on the date of this encounter reviewing test results, documenting findings, providing education, coordinating care and directly consulting with the patient and/or designated family member.

## 2025-01-13 NOTE — HISTORY OF PRESENT ILLNESS
[Disease:__________________________] : Disease: [unfilled] [ECOG Performance Status: 1 - Restricted in physically strenuous activity but ambulatory and able to carry out work of a light or sedentary nature] : Performance Status: 1 - Restricted in physically strenuous activity but ambulatory and able to carry out work of a light or sedentary nature, e.g., light house work, office work [de-identified] : Initial Presentation: 83 Persian-speaking F with MedHx of pancreatic insufficiency, HTN  who initially presented to [facility] on 5/30[facility] with complaints of 1 month of cough, SOB, fevers, body-aches, bruising and sore throat. She was found to have neutropenic fevers with ANC 0.00 in the setting of a WBC count of 24k and 85% blasts. 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 Her diagnosis was made on peripheral blood and she did not undergo BM biopsy on admission.  She was started on ATRA 5/31/24 and PHILIPPE on 6/1/24. Due to hyperleukocytosis as high as 28k, she was given GO x 1 on 6/1/24 in addition to a short course of hydroxyurea (5/31/24 - 6/1/24). 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 Regarding her body aches, she had some shoulder pain where upon X-ray showed the presence of acupuncture needle tips in her muscle and possibly areas of her bones in the shoulder. She underwent BM biopsy on 7/2/24 (Day 32 of combined PHILIPPE and ATRA), which showed a morphologic remission with residual PML-TJ on FISH at 1.5%. She was discharged to home   ==================================================================== Pathology:  Flow Cytometry on Peripheral blood: Consistent with Acute Promyelocytic Leukemia (APL), microgranular variant (74.5% of total) Myeloblasts (74.5% of total) with increased SS/FSC, positive for CD2 (subset), CD13, CD33, CD34 (subset positive, 17.6 % of total), CD38 (dim to negative), CD45 (dim), CD64 (small subset, dim),  (dim), MPO. NEGATIVE for sCD3, cyCD3, CD5, CD7, CD8, CD10, CD11b, CD14, CD15, CD16, CD19, CD20, CD56, cCD79a, ,  FISH studies show PML::TJ fusion (95%)  FLT3-ITD mutation: POSITIVE FLT3-TKD mutation: POSITIVE  Abnormal karyotype: 46,XX,t(15;17)(q24;q21)    {15         }/46,XX     {5         }  [facility] [place] My[facility] NGS Panel on peripheral blood 6/11/24: DNMT3A, p.Kaw801Krn, I, 2, VAF 47% c.2207G>A 19 FLT3, p.Arm429Thy, I, 13, VAF 24% c.2504A>T 20 FLT3, p.Fyu304Mcr, I, 13, VAF 3% c.2503G>T 20 TET2, p.Rdj3153Iao, I, 4, VAF 47% c.3686T>C 6 FLT3, p.Oem973_Iea742zro, I, 13, VAF <1% c.1789_1809 dup 14 *Alt: CCCATTTGAGATCATATTCATA  FLT3, p.?, I, 13, VAF <1% c.1837+4_18 37+5ins81 14 *Alt: CTTACTAAACTCTAAATTTTCTCTTGGAAACTCCCATTTGAGATCATATTCATATTCTCTGAAATCAACGTAGAGGTACTCA FLT3, p.Jlu213_Dhx741jol82, I, 13, VAF 14% c.1824_1825 ins45 14 *Alt: TTTCTCTTGGAAACTCCCATTTGAGATCATATTCATATGGGGGCTC  ==================================================================== Contacts:  Son in law Garth: [phone]  ==================================================================== Care Providers:  PMD: Dr Michelle Morin (Tel: [phone]; Fax: [phone]) Cardiology: Dr Roberth Herrera (Tel: [phone])   ==================================================================== [de-identified] : See above [de-identified] : 7/11/24: Initial outpatient visit. Hebrew  Kandice [id number]. She was discharged from [facility] leukemia unit on 7/9/24 and resumed PHILIPPE therapy the following day on 7/10/24 at [facility].   7/24/24: Follow-up. Hebrew  ID [id number]. She is currently wrapping up her 2 weeks off of therapy after induction. Her post induction peripheral blood PML-TJ testing was negative. She complains today of itching on her abdomen, legs and joint pains with continued swelling of her hands and feet. She forgot to stop Tretinoin temporarily. She was advised to hold it today until 7/29/24 (as this can cause skin irritation and itching). She continues to use furosemide prescribed by her PMD (Dr Morin) and we explained all other medications outside of Tretinoin and Arsenic trioxide will be managed by her other doctors.  8/20/24: Follow-up. [employer] [facility] [id number] for Hebrew. Patient continues to have skin issues. We discussed the effect of arsenic and that creams can continue to be used. She will soon have a drug holiday for 4 weeks (PHILIPPE). She also complains of chronic MSK issues including low back pain, leg muscle pain. No bleeding issues, no leg edema.  9/16/24: Follow-up. Today is cycle 1 day 49. She forgot to take ATRA days 29-42 and instead started on day 47 (Sept 13). She continues to have right sided MSK complaints, unrelated to therapy / APL. We discussed follow-up with her PMD is warranted. She continues to have skin rash and itchiness, albeit somewhat improved, however she continues to use hydrocortisone cream as needed.  10/9/24: Follow-up. Today is Cycle 2 day 17. Family used for interpretation. She reports continued itching. She also has chills at night. No other constitutional issues. No headaches. She denies recent infections, fevers, GI or  issues.   11/13/24: Follow-up. Presents today with her son who translates for her. She is on C2 D52 of tretinoin. She will begin C3 with PHILIPPE on 11/18. Blood counts reviewed, proceed with planned treatment. Enquires about using protein and ginseng supplements. No constitutional symptoms, no bleeding concerns, no concerns for infection. No interval hospitalizations or urgent care visits.   12/3/24: Follow-up. Hebrew  Piyush ([id number]). Today is cycle 3 Day 9. She complains of headache, Tylenol helps, she also complains of facial swelling, dry cough and clear rhinorrhea for about 1 week. No known sick contacts at home. She also noted right toe / anterior foot swelling that is chronic. No pain. She is tolerating the PHILIPPE well. On schedule with tretinoin. No constitutional issues.  12/30/24: Patient presents for follow up today. She is cycle 3 day 43 today. Patient denies any fevers or chills. Has some tremors of the hands, fingers, and feet has been chronic, not getting worse. No chest pain or shortness of breath. Sometimes has a weird feeling in the left side unable to really localize states that it is like a "prickly feeling". Feeling like she is getting a lot of phlegm as well, denies any throat pain or soreness. Whenever she coughts she gets whilte phlegm. Recently admitted to [facility] on 12/13 for Staph Epi bacteremia, had PICC line replaced from [facility] to [facility]. Completed her ATRA on 12/15/24. Was also in the ED on 12/24 for hyperkalemia, w/u was negative, her K returned back to normal and EKG with QTc of 454.   1/13/25: Follow-up. Today is Cycle 4 Day 1 of ATRA/PHILIPPE. Hebrew  Tate ([id number]). She reports abdominal pain yesterday and the day prior which is resolved today. Today she now reports shoulder and upper back and neck pain which is chronic. Physical therapy has been rx for her, however she reports that it has not helped. Her last PT appt was last Thursday Jan 9, 2025.  No recent infections as far as she is aware. No constitutional issues. She continues to have mild tremors.  A comprehensive review of systems was performed including constitutional, eyes, ENT, cardiovascular, respiratory, gastrointestinal, genitourinary, musculoskeletal, integumentary, neurological, psychiatric and hematologic / lymphatic. All pertinent positives are included in the H&P under interval history above and the remaining review of systems listed are negative.   ================================================================== Treatment Hx:  Induction: - Cycle 1 Day 1 ATRA 5/31/24 and PHILIPPE on 6/1/24. (hyperleukocytosis rec'd GO x 1 on 6/1/24 in addition to a short course of hydroxyurea (5/31/24 - 6/1/24)). She achieved MRD negativity on peripheral blood of PML-TJ (Day 44).  Consolidation with ATRA (2 weeks on and 2 weeks off x 8) and Arsenic Trioxide (4 weeks on and 4 weeks off x 4 cycles): - Cycle 1 Day 1 on 7/29/24 - Cycle 2 Day 1 on 9/23/24 - Cycle 3 Day 1 on 11/18/24 (last two weeks of ATRA were from 12/30/24-1/12/25) - Cycle 4 Day 1 on 1/13/25    ================================================================== [FreeTextEntry1] : Meagan fountain APL [id number] Tretinoin + Arsenic + GO induction followed by Tretinoin and Arsenic consolidation

## 2025-03-11 NOTE — PHYSICAL EXAM
[Restricted in physically strenuous activity but ambulatory and able to carry out work of a light or sedentary nature] : Status 1- Restricted in physically strenuous activity but ambulatory and able to carry out work of a light or sedentary nature, e.g., light house work, office work [Normal] : affect appropriate [de-identified] : Elevated BMI

## 2025-03-11 NOTE — ASSESSMENT
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 Bone marrow biopsy deferred at diagnosis. BM biopsy on day 32 of induction showed a morphologic response without myeloid immaturity, however there were 1.5% PML-TJ containing cells (likely differentiated) on aspirate by FISH. She was MRD negative via peripheral blood testing of PML-TJ on 7/13/24.  Consolidation with ATRA/PHILIPPE was started on 7/29/24. Her last dose of consolidation was 2/7/25. Plan for post-consolidation BM biopsy.  Heme:  - Plan for BM biopsy within next 2 weeks. Discussed completion of treatment and next steps. - Continue observation off therapy, every 3 months labs and exam with CBC, CMP, PML-TJ RT-qPCR testing on peripheral blood (Pre-V) - Hand tremors, possibly related to PHILIPPE and effects exacerbated in a low thiamine state, recommended multivitamin and B-complex vitamin again today. - HCM: All health conditions not related to Hx of APL to be managed by other providers / PMD. Discussed that she should only take aspirin to treat a condition rather than general prevention given bleeding risks. - Follow-up every 12 weeks  ___ I personally have spent a total of 30 minutes of time on the date of this encounter reviewing test results, documenting findings, providing education, coordinating care and directly consulting with the patient and/or designated family member.

## 2025-03-11 NOTE — ASSESSMENT
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 Bone marrow biopsy deferred at diagnosis. BM biopsy on day 32 of induction showed a morphologic response without myeloid immaturity, however there were 1.5% PML-TJ containing cells (likely differentiated) on aspirate by FISH. She was MRD negative via peripheral blood testing of PML-TJ on 7/13/24.  Consolidation with ATRA/PHILIPPE was started on 7/29/24. Her last dose of consolidation was 2/7/25. Plan for post-consolidation BM biopsy.  Heme:  - Plan for BM biopsy within next 2 weeks. Discussed completion of treatment and next steps. - Continue observation off therapy, every 3 months labs and exam with CBC, CMP, PML-TJ RT-qPCR testing on peripheral blood (Pre-V) - Hand tremors, possibly related to PHILIPPE and effects exacerbated in a low thiamine state, recommended multivitamin and B-complex vitamin again today. - HCM: All health conditions not related to Hx of APL to be managed by other providers / PMD. Discussed that she should only take aspirin to treat a condition rather than general prevention given bleeding risks. - Follow-up every 12 weeks  ___ I personally have spent a total of 30 minutes of time on the date of this encounter reviewing test results, documenting findings, providing education, coordinating care and directly consulting with the patient and/or designated family member.

## 2025-03-11 NOTE — PHYSICAL EXAM
[Restricted in physically strenuous activity but ambulatory and able to carry out work of a light or sedentary nature] : Status 1- Restricted in physically strenuous activity but ambulatory and able to carry out work of a light or sedentary nature, e.g., light house work, office work [Normal] : affect appropriate [de-identified] : Elevated BMI

## 2025-03-11 NOTE — HISTORY OF PRESENT ILLNESS
[Disease:__________________________] : Disease: [unfilled] [ECOG Performance Status: 1 - Restricted in physically strenuous activity but ambulatory and able to carry out work of a light or sedentary nature] : Performance Status: 1 - Restricted in physically strenuous activity but ambulatory and able to carry out work of a light or sedentary nature, e.g., light house work, office work [de-identified] : Initial Presentation: 83 [place]az[place]-speaking F with MedHx of pancreatic insufficiency, HTN  who initially presented to [facility] on 5/30/24 with complaints of 1 month of cough, SOB, fevers, body-aches, bruising and sore throat. She was found to have neutropenic fevers with ANC 0.00 in the setting of a WBC count of 24k and 85% blasts. 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 Her diagnosis was made on peripheral blood and she did not undergo BM biopsy on admission.  She was started on ATRA 5/31/24 and PHILIPPE on 6/1/24. Due to hyperleukocytosis as high as 28k, she was given GO x 1 on 6/1/24 in addition to a short course of hydroxyurea (5/31/24 - 6/1/24). 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 Regarding her body aches, she had some shoulder pain where upon X-ray showed the presence of acupuncture needle tips in her muscle and possibly areas of her bones in the shoulder. She underwent BM biopsy on 7/2/24 (Day 32 of combined PHILIPPE and ATRA), which showed a morphologic remission with residual PML-TJ on FISH at 1.5%. She was discharged to home   ==================================================================== Pathology:  Flow Cytometry on Peripheral blood: Consistent with Acute Promyelocytic Leukemia (APL), microgranular variant (74.5% of total) Myeloblasts (74.5% of total) with increased SS/FSC, positive for CD2 (subset), CD13, CD33, CD34 (subset positive, 17.6 % of total), CD38 (dim to negative), CD45 (dim), CD64 (small subset, dim),  (dim), MPO. NEGATIVE for sCD3, cyCD3, CD5, CD7, CD8, CD10, CD11b, CD14, CD15, CD16, CD19, CD20, CD56, cCD79a, ,  FISH studies show PML::TJ fusion (95%)  FLT3-ITD mutation: POSITIVE FLT3-TKD mutation: POSITIVE  Abnormal karyotype: 46,XX,t(15;17)(q24;q21)     {15           }/46,XX      {5           }  [facility]/11[facility]: DNMT3A, p.Uft441Zru, I, 2, VAF 47% c.2207G>A 19 FLT3, p.Iqy112Cpj, I, 13, VAF 24% c.2504A>T 20 FLT3, p.Eba446Yoa, I, 13, VAF 3% c.2503G>T 20 TET2, p.Kxj1130Nfh, I, 4, VAF 47% c.3686T>C 6 FLT3, p.Nwk587_Vsc272cqy, I, 13, VAF <1% c.1789_1809 dup 14 *Alt: CCCATTTGAGATCATATTCATA  FLT3, p.?, I, 13, VAF <1% c.1837+4_18 37+5ins81 14 *Alt: CTTACTAAACTCTAAATTTTCTCTTGGAAACTCCCATTTGAGATCATATTCATATTCTCTGAAATCAACGTAGAGGTACTCA FLT3, p.Hwf396_Axo880vla31, I, 13, VAF 14% c.1824_1825 ins45 14 *Alt: TTTCTCTTGGAAACTCCCATTTGAGATCATATTCATATGGGGGCTC  ==================================================================== Contacts:  Son in law Garth: [phone]  ==================================================================== Care Providers:  PMD: Dr Michelle Morin (Tel: [phone]; Fax: [phone]) Cardiology: Dr Roberth Herrera (Tel: [phone])   ==================================================================== [de-identified] : See above [de-identified] : 7/11/24: Initial outpatient visit. [place]ic  Kandice [id number]. She was discharged from [facility] leukemia unit on 7/9/24 and resumed PHILIPPE therapy the following day on 7/10/24 at [facility].   7/24/24: Follow-up. [place]ic  ID [id number]. She is currently wrapping up her 2 weeks off of therapy after induction. Her post induction peripheral blood PML-TJ testing was negative. She complains today of itching on her abdomen, legs and joint pains with continued swelling of her hands and feet. She forgot to stop Tretinoin temporarily. She was advised to hold it today until 7/29/24 (as this can cause skin irritation and itching). She continues to use furosemide prescribed by her PMD (Dr Morin) and we explained all other medications outside of Tretinoin and Arsenic trioxide will be managed by her other doctors.  8/20/24: Follow-up. [employer] Sep[employer]h [id number] for [place]ic. Patient continues to have skin issues. We discussed the effect of arsenic and that creams can continue to be used. She will soon have a drug holiday for 4 weeks (PHILIPPE). She also complains of chronic MSK issues including low back pain, leg muscle pain. No bleeding issues, no leg edema.  9/16/24: Follow-up. Today is cycle 1 day 49. She forgot to take ATRA days 29-42 and instead started on day 47 (Sept 13). She continues to have right sided MSK complaints, unrelated to therapy / APL. We discussed follow-up with her PMD is warranted. She continues to have skin rash and itchiness, albeit somewhat improved, however she continues to use hydrocortisone cream as needed.  10/9/24: Follow-up. Today is Cycle 2 day 17. Family used for interpretation. She reports continued itching. She also has chills at night. No other constitutional issues. No headaches. She denies recent infections, fevers, GI or  issues.   11/13/24: Follow-up. Presents today with her son who translates for her. She is on C2 D52 of tretinoin. She will begin C3 with PHILIPPE on 11/18. Blood counts reviewed, proceed with planned treatment. Enquires about using protein and ginseng supplements. No constitutional symptoms, no bleeding concerns, no concerns for infection. No interval hospitalizations or urgent care visits.   12/3/24: Follow-up. [place]ic  Piyush ([id number]). Today is cycle 3 Day 9. She complains of headache, Tylenol helps, she also complains of facial swelling, dry cough and clear rhinorrhea for about 1 week. No known sick contacts at home. She also noted right toe / anterior foot swelling that is chronic. No pain. She is tolerating the PHILIPPE well. On schedule with tretinoin. No constitutional issues.  12/30/24: Patient presents for follow up today. She is cycle 3 day 43 today. Patient denies any fevers or chills. Has some tremors of the hands, fingers, and feet has been chronic, not getting worse. No chest pain or shortness of breath. Sometimes has a weird feeling in the left side unable to really localize states that it is like a "prickly feeling". Feeling like she is getting a lot of phlegm as well, denies any throat pain or soreness. Whenever she coughts she gets whilte phlegm. Recently admitted to [facility] on 12/13 for Staph Epi bacteremia, had PICC line replaced from [facility] to [facility]. Completed her ATRA on 12/15/24. Was also in the ED on 12/24 for hyperkalemia, w/u was negative, her K returned back to normal and EKG with QTc of 454.   1/13/25: Follow-up. Today is Cycle 4 Day 1 of ATRA/PHILIPPE. [place]ic  Tate ([id number]). She reports abdominal pain yesterday and the day prior which is resolved today. Today she now reports shoulder and upper back and neck pain which is chronic. Physical therapy has been rx for her, however she reports that it has not helped. Her last PT appt was last Thursday Jan 9, 2025.  No recent infections as far as she is aware. No constitutional issues. She continues to have mild tremors.  3/10/25: Follow-up. She completed PHILIPPE/ATRA consolidation therapy 1 month ago. Her skin issues have improved. She continues to have lower extremity pains. She was recently prescribed aspirin, however she does not know the reason why. Discussed with her to reach out to her PMD to determine the purpose given ASA can be risky in patients with recent APL Hx and should not be given for prevention purposes alone in this patient population unless ASCVD related. She continues to have hand tremors but has yet to start recommended vitamins. No bleeding issues or constitutional issues.  A comprehensive review of systems was performed including constitutional, eyes, ENT, cardiovascular, respiratory, gastrointestinal, genitourinary, musculoskeletal, integumentary, neurological, psychiatric and hematologic / lymphatic. All pertinent positives are included in the H&P under interval history above and the remaining review of systems listed are negative.   ================================================================== Treatment Hx:  Induction: - Cycle 1 Day 1 ATRA 5/31/24 and PHILIPPE on 6/1/24. (hyperleukocytosis rec'd GO x 1 on 6/1/24 in addition to a short course of hydroxyurea (5/31/24 - 6/1/24)). She achieved MRD negativity on peripheral blood of PML-TJ (Day 44).  Consolidation with ATRA (2 weeks on and 2 weeks off x 8) and Arsenic Trioxide (4 weeks on and 4 weeks off x 4 cycles): - Cycle 1 Day 1 on 7/29/24 - Cycle 2 Day 1 on 9/23/24 - Cycle 3 Day 1 on 11/18/24 (last two weeks of ATRA were from 12/30/24-1/12/25) - Cycle 4 Day 1 on 1/13/25    ================================================================== [FreeTextEntry1] : Meagan fountain APL 0406 Tretinoin + Arsenic + GO induction followed by Tretinoin and Arsenic consolidation

## 2025-03-11 NOTE — HISTORY OF PRESENT ILLNESS
[Disease:__________________________] : Disease: [unfilled] [ECOG Performance Status: 1 - Restricted in physically strenuous activity but ambulatory and able to carry out work of a light or sedentary nature] : Performance Status: 1 - Restricted in physically strenuous activity but ambulatory and able to carry out work of a light or sedentary nature, e.g., light house work, office work [de-identified] : Initial Presentation: 83 [place]-speaking F with MedHx of pancreatic insufficiency, HTN  who initially presented to [facility] on 5/30/24 with complaints of 1 month of cough, SOB, fevers, body-aches, bruising and sore throat. She was found to have neutropenic fevers with ANC 0.00 in the setting of a WBC count of 24k and 85% blasts. 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 Her diagnosis was made on peripheral blood and she did not undergo BM biopsy on admission.  She was started on ATRA 5/31/24 and PHILIPPE on 6/1/24. Due to hyperleukocytosis as high as 28k, she was given GO x 1 on 6/1/24 in addition to a short course of hydroxyurea (5/31/24 - 6/1/24). 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 Regarding her body aches, she had some shoulder pain where upon X-ray showed the presence of acupuncture needle tips in her muscle and possibly areas of her bones in the shoulder. She underwent BM biopsy on 7/2/24 (Day 32 of combined PHILIPPE and ATRA), which showed a morphologic remission with residual PML-TJ on FISH at 1.5%. She was discharged to home   ==================================================================== Pathology:  Flow Cytometry on Peripheral blood: Consistent with Acute Promyelocytic Leukemia (APL), microgranular variant (74.5% of total) Myeloblasts (74.5% of total) with increased SS/FSC, positive for CD2 (subset), CD13, CD33, CD34 (subset positive, 17.6 % of total), CD38 (dim to negative), CD45 (dim), CD64 (small subset, dim),  (dim), MPO. NEGATIVE for sCD3, cyCD3, CD5, CD7, CD8, CD10, CD11b, CD14, CD15, CD16, CD19, CD20, CD56, cCD79a, ,  FISH studies show PML::TJ fusion (95%)  FLT3-ITD mutation: POSITIVE FLT3-TKD mutation: POSITIVE  Abnormal karyotype: 46,XX,t(15;17)(q24;q21)     {15           }/46,XX      {5           }  [facility]: DNMT3A, p.Vmu150Qrr, I, 2, VAF 47% c.2207G>A 19 FLT3, p.Dsr418Lhz, I, 13, VAF 24% c.2504A>T 20 FLT3, p.Bgg183Pea, I, 13, VAF 3% c.2503G>T 20 TET2, p.Uhk2652Rsl, I, 4, VAF 47% c.3686T>C 6 FLT3, p.Drd888_Nbt505unk, I, 13, VAF <1% c.1789_1809 dup 14 *Alt: CCCATTTGAGATCATATTCATA  FLT3, p.?, I, 13, VAF <1% c.1837+4_18 37+5ins81 14 *Alt: CTTACTAAACTCTAAATTTTCTCTTGGAAACTCCCATTTGAGATCATATTCATATTCTCTGAAATCAACGTAGAGGTACTCA FLT3, p.Ryc305_Izw810zkf53, I, 13, VAF 14% c.1824_1825 ins45 14 *Alt: TTTCTCTTGGAAACTCCCATTTGAGATCATATTCATATGGGGGCTC  ==================================================================== Contacts:  Son in law Garth: [phone]  ==================================================================== Care Providers:  PMD: Dr Michelle Morin (Tel: [phone]; Fax: [phone]) Cardiology: Dr Roberth Herrera (Tel: [phone])   ==================================================================== [de-identified] : See above [de-identified] : 7/11/24: Initial outpatient visit. Polish  Kandice [id number]. She was discharged from [facility] leukemia unit on 7/9/24 and resumed PHILIPPE therapy the following day on 7/10/24 at [facility] [place].   7/24/24: Follow-up. Polish  ID [id number]. She is currently wrapping up her 2 weeks off of therapy after induction. Her post induction peripheral blood PML-TJ testing was negative. She complains today of itching on her abdomen, legs and joint pains with continued swelling of her hands and feet. She forgot to stop Tretinoin temporarily. She was advised to hold it today until 7/29/24 (as this can cause skin irritation and itching). She continues to use furosemide prescribed by her PMD (Dr Morin) and we explained all other medications outside of Tretinoin and Arsenic trioxide will be managed by her other doctors.  8/20/24: Follow-up. [employer] Sep[employer]h [id number] for Polish. Patient continues to have skin issues. We discussed the effect of arsenic and that creams can continue to be used. She will soon have a drug holiday for 4 weeks (PHILIPPE). She also complains of chronic MSK issues including low back pain, leg muscle pain. No bleeding issues, no leg edema.  9/16/24: Follow-up. Today is cycle 1 day 49. She forgot to take ATRA days 29-42 and instead started on day 47 (Sept 13). She continues to have right sided MSK complaints, unrelated to therapy / APL. We discussed follow-up with her PMD is warranted. She continues to have skin rash and itchiness, albeit somewhat improved, however she continues to use hydrocortisone cream as needed.  10/9/24: Follow-up. Today is Cycle 2 day 17. Family used for interpretation. She reports continued itching. She also has chills at night. No other constitutional issues. No headaches. She denies recent infections, fevers, GI or  issues.   11/13/24: Follow-up. Presents today with her son who translates for her. She is on C2 D52 of tretinoin. She will begin C3 with PHILIPPE on 11/18. Blood counts reviewed, proceed with planned treatment. Enquires about using protein and ginseng supplements. No constitutional symptoms, no bleeding concerns, no concerns for infection. No interval hospitalizations or urgent care visits.   12/3/24: Follow-up. Polish  Piyush ([id number]). Today is cycle 3 Day 9. She complains of headache, Tylenol helps, she also complains of facial swelling, dry cough and clear rhinorrhea for about 1 week. No known sick contacts at home. She also noted right toe / anterior foot swelling that is chronic. No pain. She is tolerating the PHILIPPE well. On schedule with tretinoin. No constitutional issues.  12/30/24: Patient presents for follow up today. She is cycle 3 day 43 today. Patient denies any fevers or chills. Has some tremors of the hands, fingers, and feet has been chronic, not getting worse. No chest pain or shortness of breath. Sometimes has a weird feeling in the left side unable to really localize states that it is like a "prickly feeling". Feeling like she is getting a lot of phlegm as well, denies any throat pain or soreness. Whenever she coughts she gets whilte phlegm. Recently admitted to [facility] on 12/13 for Staph Epi bacteremia, had [facility] line replaced [facility] to [facility]. Completed her ATRA on 12/15/24. Was also in the ED on 12/24 for hyperkalemia, w/u was negative, her K returned back to normal and EKG with QTc of 454.   1/13/25: Follow-up. Today is Cycle 4 Day 1 of ATRA/PHILIPPE. Polish  Tate ([id number]). She reports abdominal pain yesterday and the day prior which is resolved today. Today she now reports shoulder and upper back and neck pain which is chronic. Physical therapy has been rx for her, however she reports that it has not helped. Her last PT appt was last Thursday Jan 9, 2025.  No recent infections as far as she is aware. No constitutional issues. She continues to have mild tremors.  3/10/25: Follow-up. She completed PHILIPPE/ATRA consolidation therapy 1 month ago. Her skin issues have improved. She continues to have lower extremity pains. She was recently prescribed aspirin, however she does not know the reason why. Discussed with her to reach out to her PMD to determine the purpose given ASA can be risky in patients with recent APL Hx and should not be given for prevention purposes alone in this patient population unless ASCVD related. She continues to have hand tremors but has yet to start recommended vitamins. No bleeding issues or constitutional issues.  A comprehensive review of systems was performed including constitutional, eyes, ENT, cardiovascular, respiratory, gastrointestinal, genitourinary, musculoskeletal, integumentary, neurological, psychiatric and hematologic / lymphatic. All pertinent positives are included in the H&P under interval history above and the remaining review of systems listed are negative.   ================================================================== Treatment Hx:  Induction: - Cycle 1 Day 1 ATRA 5/31/24 and PHILIPPE on 6/1/24. (hyperleukocytosis rec'd GO x 1 on 6/1/24 in addition to a short course of hydroxyurea (5/31/24 - 6/1/24)). She achieved MRD negativity on peripheral blood of PML-TJ (Day 44).  Consolidation with ATRA (2 weeks on and 2 weeks off x 8) and Arsenic Trioxide (4 weeks on and 4 weeks off x 4 cycles): - Cycle 1 Day 1 on 7/29/24 - Cycle 2 Day 1 on 9/23/24 - Cycle 3 Day 1 on 11/18/24 (last two weeks of ATRA were from 12/30/24-1/12/25) - Cycle 4 Day 1 on 1/13/25    ================================================================== [FreeTextEntry1] : Meagan fountain APL 0406 Tretinoin + Arsenic + GO induction followed by Tretinoin and Arsenic consolidation

## 2025-04-14 NOTE — REASON FOR VISIT
[Bone Marrow Biopsy] : bone marrow biopsy [Bone Marrow Aspiration] : bone marrow aspiration [Family Member] : family member [FreeTextEntry2] : 83 yo F w/ APL s/p consolidation PHILIPPE/ATRA on 2/7/25. Post tx BMBx

## 2025-04-14 NOTE — PROCEDURE
[Bone Marrow Biopsy] : bone marrow biopsy [Bone Marrow Aspiration] : bone marrow aspiration  [Patient] : the patient [Verbal Consent Obtained] : verbal consent was obtained prior to the procedure [Patient identification verified] : patient identification verified [Procedure verified and consent obtained] : procedure verified and consent obtained [Laterality verified and correct site marked] : laterality verified and correct site marked [Left] : site: left [Correct positioning] : correct positioning [Prone] : prone [Superior iliac spine was identified] : the superior iliac spine was identified. [The left posterior iliac crest was prepped with betadine and draped, using sterile technique.] : The left posterior iliac crest was prepped with betadine and draped, using sterile technique. [Lidocaine was injected and into the periosteum overlying the site.] : Lidocaine was injected and into the periosteum overlying the site. [Aspirate] : aspirate [Cytogenetics] : cytogenetics [FISH] : FISH [Biopsy] : biopsy [Flow Cytometry] : flow cytometry [] : The patient was instructed to remove the bandage the following AM. The patient may bathe. Acetaminophen may be taken for discomfort, as per package directions.If there are any other problems, the patient was instructed to call the office. The patient verbalized understanding, and is aware of the office contact numbers. [FreeTextEntry1] : 85 yo F w/ APL s/p consolidation PHILIPPE/ATRA on 2/7/25. Post tx BMBx [FreeTextEntry2] : 7 cc of 2% Lidocaine was used for the procedure   WBC: 4.58K/ul Hgb: 12g/dL Hct: 36% Plts: 193K/uL   Bone marrow aspiration and biopsy were done. AML panel, iron stain, PML-TJ rt PCR requested. Touch preps made and sent. Pressure applied > 10 mins - no S&S of bleeding noted.

## 2025-06-25 NOTE — HISTORY OF PRESENT ILLNESS
[Disease:__________________________] : Disease: [unfilled] [ECOG Performance Status: 1 - Restricted in physically strenuous activity but ambulatory and able to carry out work of a light or sedentary nature] : Performance Status: 1 - Restricted in physically strenuous activity but ambulatory and able to carry out work of a light or sedentary nature, e.g., light house work, office work [de-identified] : Initial Presentation: 83 Greek-speaking F with MedHx of pancreatic insufficiency, HTN  who initially presented to [facility] on 5/30/24 with complaints of 1 month of cough, SOB, fevers, body-aches, bruising and sore throat. She was found to have neutropenic fevers with ANC 0.00 in the setting of a WBC count of 24k and 85% blasts. 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 Her diagnosis was made on peripheral blood and she did not undergo BM biopsy on admission.  She was started on ATRA 5/31/24 and PHILIPPE on 6/1/24. Due to hyperleukocytosis as high as 28k, she was given GO x 1 on 6/1/24 in addition to a short course of hydroxyurea (5/31/24 - 6/1/24). 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 Regarding her body aches, she had some shoulder pain where upon X-ray showed the presence of acupuncture needle tips in her muscle and possibly areas of her bones in the shoulder. She underwent BM biopsy on 7/2/24 (Day 32 of combined PHILIPPE and ATRA), which showed a morphologic remission with residual PML-TJ on FISH at 1.5%. She was discharged to home   ==================================================================== Pathology:  Flow Cytometry on Peripheral blood: Consistent with Acute Promyelocytic Leukemia (APL), microgranular variant (74.5% of total) Myeloblasts (74.5% of total) with increased SS/FSC, positive for CD2 (subset), CD13, CD33, CD34 (subset positive, 17.6 % of total), CD38 (dim to negative), CD45 (dim), CD64 (small subset, dim),  (dim), MPO. NEGATIVE for sCD3, cyCD3, CD5, CD7, CD8, CD10, CD11b, CD14, CD15, CD16, CD19, CD20, CD56, cCD79a, ,  FISH studies show PML::TJ fusion (95%)  FLT3-ITD mutation: POSITIVE FLT3-TKD mutation: POSITIVE  Abnormal karyotype: 46,XX,t(15;17)(q24;q21)      {15             }/46,XX       {5             }  [facility]: DNMT3A, p.Nln928Zqi, I, 2, VAF 47% c.2207G>A 19 FLT3, p.Bff914Snt, I, 13, VAF 24% c.2504A>T 20 FLT3, p.Xma720Zoe, I, 13, VAF 3% c.2503G>T 20 TET2, p.Ave6644Mja, I, 4, VAF 47% c.3686T>C 6 FLT3, p.Cbh882_Ekq336xal, I, 13, VAF <1% c.1789_1809 dup 14 *Alt: CCCATTTGAGATCATATTCATA  FLT3, p.?, I, 13, VAF <1% c.1837+4_18 37+5ins81 14 *Alt: CTTACTAAACTCTAAATTTTCTCTTGGAAACTCCCATTTGAGATCATATTCATATTCTCTGAAATCAACGTAGAGGTACTCA FLT3, p.Bcf136_Uub430ogs23, I, 13, VAF 14% c.1824_1825 ins45 14 *Alt: TTTCTCTTGGAAACTCCCATTTGAGATCATATTCATATGGGGGCTC  ==================================================================== Contacts:  Son in law Garth: [phone]  ==================================================================== Care Providers:  PMD: Dr Michelle Morin (Tel: [phone]; Fax: [phone]) Cardiology: Dr Roberth Herrera (Tel: [phone])   ==================================================================== [de-identified] : See above [FreeTextEntry1] : Meagan fountain APL 0406 Tretinoin + Arsenic + GO induction followed by Tretinoin and Arsenic consolidation [de-identified] : 7/11/24: Initial outpatient visit. Serbian  Kandice [id number]. She was discharged from [facility] leukemia unit on 7/9/24 and resumed PHILIPPE therapy the following day on 7/10/24 at [facility].   7/24/24: Follow-up. Serbian  ID [id number]. She is currently wrapping up her 2 weeks off of therapy after induction. Her post induction peripheral blood PML-TJ testing was negative. She complains today of itching on her abdomen, legs and joint pains with continued swelling of her hands and feet. She forgot to stop Tretinoin temporarily. She was advised to hold it today until 7/29/24 (as this can cause skin irritation and itching). She continues to use furosemide prescribed by her PMD (Dr Morin) and we explained all other medications outside of Tretinoin and Arsenic trioxide will be managed by her other doctors.  8/20/24: Follow-up. [employer]h [id number] for Serbian. Patient continues to have skin issues. We discussed the effect of arsenic and that creams can continue to be used. She will soon have a drug holiday for 4 weeks (PHILIPPE). She also complains of chronic MSK issues including low back pain, leg muscle pain. No bleeding issues, no leg edema.  9/16/24: Follow-up. Today is cycle 1 day 49. She forgot to take ATRA days 29-42 and instead started on day 47 (Sept 13). She continues to have right sided MSK complaints, unrelated to therapy / APL. We discussed follow-up with her PMD is warranted. She continues to have skin rash and itchiness, albeit somewhat improved, however she continues to use hydrocortisone cream as needed.  10/9/24: Follow-up. Today is Cycle 2 day 17. Family used for interpretation. She reports continued itching. She also has chills at night. No other constitutional issues. No headaches. She denies recent infections, fevers, GI or  issues.   11/13/24: Follow-up. Presents today with her son who translates for her. She is on C2 D52 of tretinoin. She will begin C3 with PHILIPPE on 11/18. Blood counts reviewed, proceed with planned treatment. Enquires about using protein and ginseng supplements. No constitutional symptoms, no bleeding concerns, no concerns for infection. No interval hospitalizations or urgent care visits.   12/3/24: Follow-up. Serbian  Piyush ([id number]). Today is cycle 3 Day 9. She complains of headache, Tylenol helps, she also complains of facial swelling, dry cough and clear rhinorrhea for about 1 week. No known sick contacts at home. She also noted right toe / anterior foot swelling that is chronic. No pain. She is tolerating the PHILIPPE well. On schedule with tretinoin. No constitutional issues.  12/30/24: Patient presents for follow up today. She is cycle 3 day 43 today. Patient denies any fevers or chills. Has some tremors of the hands, fingers, and feet has been chronic, not getting worse. No chest pain or shortness of breath. Sometimes has a weird feeling in the left side unable to really localize states that it is like a "prickly feeling". Feeling like she is getting a lot of phlegm as well, denies any throat pain or soreness. Whenever she coughts she gets whilte phlegm. Recently admitted to [facility] on 12/13 for Staph Epi bacteremia, had PICC line replaced from [facility] to [facility]. Completed her ATRA on 12/15/24. Was also in the ED on 12/24 for hyperkalemia, w/u was negative, her K returned back to normal and EKG with QTc of 454.   1/13/25: Follow-up. Today is Cycle 4 Day 1 of ATRA/PHILIPPE. Serbian  Tate ([id number]). She reports abdominal pain yesterday and the day prior which is resolved today. Today she now reports shoulder and upper back and neck pain which is chronic. Physical therapy has been rx for her, however she reports that it has not helped. Her last PT appt was last Thursday Jan 9, 2025.  No recent infections as far as she is aware. No constitutional issues. She continues to have mild tremors.  3/10/25: Follow-up. She completed PHILIPPE/ATRA consolidation therapy 1 month ago. Her skin issues have improved. She continues to have lower extremity pains. She was recently prescribed aspirin, however she does not know the reason why. Discussed with her to reach out to her PMD to determine the purpose given ASA can be risky in patients with recent APL Hx and should not be given for prevention purposes alone in this patient population unless ASCVD related. She continues to have hand tremors but has yet to start recommended vitamins. No bleeding issues or constitutional issues.  6/25/25: Follow-up. She feels well overall. She complains of acute on chronic low back pain, acutely worse in the last 3 months. She recently had a spinal injection from her pain doctor last week, but has not helped. She also continues to have chronic right shoulder pain, the same shoulder with retained acupuncture needles. She continues to do acupuncture and has said that PT has helped previously. No constitutional issues, no masses.  A comprehensive review of systems was performed including constitutional, eyes, ENT, cardiovascular, respiratory, gastrointestinal, genitourinary, musculoskeletal, integumentary, neurological, psychiatric and hematologic / lymphatic. All pertinent positives are included in the H&P under interval history above and the remaining review of systems listed are negative.   ================================================================== Treatment Hx:  Induction: - Cycle 1 Day 1 ATRA 5/31/24 and PHILIPPE on 6/1/24. (hyperleukocytosis rec'd GO x 1 on 6/1/24 in addition to a short course of hydroxyurea (5/31/24 - 6/1/24)). She achieved MRD negativity on peripheral blood of PML-TJ (Day 44).  Consolidation with ATRA (2 weeks on and 2 weeks off x 8) and Arsenic Trioxide (4 weeks on and 4 weeks off x 4 cycles): - Cycle 1 Day 1 on 7/29/24 - Cycle 2 Day 1 on 9/23/24 - Cycle 3 Day 1 on 11/18/24 (last two weeks of ATRA were from 12/30/24-1/12/25) - Cycle 4 Day 1 on 1/13/25    ==================================================================

## 2025-06-25 NOTE — ASSESSMENT
[Curative] : Goals of care discussed with patient: Curative [FreeTextEntry1] : 85yo F (Greek-speaking) on front-line induction with ATRA/PHILIPPE for High-Risk APL (Dx 6/30/2024) associated with multiple Tier I mutations including FLT3-ITD, FLT3-TKD, DNMT3A, TET2. Bone marrow biopsy deferred at diagnosis. BM biopsy on day 32 of induction showed a morphologic response without myeloid immaturity, however there were 1.5% PML-TJ containing cells (likely differentiated) on aspirate by FISH. She was MRD negative via peripheral blood testing of PML-TJ on 7/13/24.  Consolidation with ATRA/PHILIPPE was started on 7/29/24. Her last dose of consolidation was 2/7/25. Post-treatment bone marrow biopsy showed CR with MRD-negativity. Now plan to monitor every 3 months with H&P and PML-TJ RT-qPCR on peripheral blood.  Heme:  - Continue observation off therapy, every 3 months labs and exam with CBC, CMP, PML-TJ RT-qPCR testing on peripheral blood (Pre-V) - Discussed results from BM bx and plan - Hand tremors, possibly related to PHILIPPE and effects exacerbated in a low thiamine state, recommended multivitamin and B-complex vitamin, since stopping therapy this has resolved. Can stop B-complex vitamins after 3 months. To discuss next visit. - HCM: Acute on chronic low back pain, shoulder pain: Referral provided for ortho-spine. All health conditions not related to Hx of APL to be managed by other providers / PMD. Discussed that she should only take aspirin to treat a condition rather than general prevention given bleeding risks. - Follow-up every 12 weeks  ___ I personally have spent a total of 30 minutes of time on the date of this encounter reviewing test results, documenting findings, providing education, coordinating care and directly consulting with the patient and/or designated family member.

## 2025-06-25 NOTE — PHYSICAL EXAM
[Restricted in physically strenuous activity but ambulatory and able to carry out work of a light or sedentary nature] : Status 1- Restricted in physically strenuous activity but ambulatory and able to carry out work of a light or sedentary nature, e.g., light house work, office work [Normal] : affect appropriate [de-identified] : Elevated BMI

## 2025-07-11 NOTE — PHYSICAL EXAM
[de-identified] : Examination of the lumbar spine reveals no midline tenderness palpation, step-offs, or skin lesions. Decreased range of motion with respect to flexion, extension, lateral bending, and rotation. No tenderness to palpation of the sciatic notch. No tenderness palpation of the bilateral greater trochanters. No pain with passive internal/external rotation of the hips. No instability of bilateral lower extremities.  Negative SHANI. Negative straight leg raise bilaterally. No bowstring. Negative femoral stretch. 5 out of 5 iliopsoas, hip abductors, hips adductors, quadriceps, hamstrings, gastrocsoleus, tibialis anterior, extensor hallucis longus, peroneals. Grossly intact sensation to light touch bilateral lower extremities. 1+ patellar and Achilles reflexes. Downgoing Babinski. No clonus. Intact proprioception. Palpable pulses. No skin lesion and no edema on the right and left lower extremities. [de-identified] : Review  of lumbar spine MRI reveals mild areas of stenosis and areas of spondylolisthesis.

## 2025-07-11 NOTE — HISTORY OF PRESENT ILLNESS
[de-identified] : Ms. KEN VALLECILLO  is a 84 year old female who presents with a chronic history of low back/buttock pain that has gotten worse since she finished her treatment for leukemia.  Denies any LE radicular symptoms.  Normal bowel and bladder control.   Denies any recent fevers, chills, sweats, weight loss, or infection.  She has done PT and one injection with minimal relief.   The patients past medical history, past surgical history, medications, allergies, and social history were reviewed by me today with the patient and documented accordingly.  In addition, the patient's family history, which is noncontributory to their visit, was also reviewed.

## 2025-07-11 NOTE — ADDENDUM
[FreeTextEntry1] : I, Angelo Knutson, have documented on this note as a scribe on behalf of  on 07/11/2025.